# Patient Record
Sex: FEMALE | Race: WHITE | NOT HISPANIC OR LATINO | URBAN - METROPOLITAN AREA
[De-identification: names, ages, dates, MRNs, and addresses within clinical notes are randomized per-mention and may not be internally consistent; named-entity substitution may affect disease eponyms.]

---

## 2022-08-01 ENCOUNTER — AMBULATORY SURGICAL CENTER (OUTPATIENT)
Dept: URBAN - METROPOLITAN AREA SURGERY 5 | Facility: SURGERY | Age: 47
End: 2022-08-01

## 2022-08-01 ENCOUNTER — OFFICE (OUTPATIENT)
Dept: URBAN - METROPOLITAN AREA PATHOLOGY 1 | Facility: PATHOLOGY | Age: 47
End: 2022-08-01

## 2022-08-01 VITALS
HEART RATE: 60 BPM | HEART RATE: 66 BPM | SYSTOLIC BLOOD PRESSURE: 157 MMHG | SYSTOLIC BLOOD PRESSURE: 133 MMHG | DIASTOLIC BLOOD PRESSURE: 88 MMHG | SYSTOLIC BLOOD PRESSURE: 114 MMHG | SYSTOLIC BLOOD PRESSURE: 136 MMHG | SYSTOLIC BLOOD PRESSURE: 148 MMHG | DIASTOLIC BLOOD PRESSURE: 95 MMHG | OXYGEN SATURATION: 100 % | SYSTOLIC BLOOD PRESSURE: 136 MMHG | RESPIRATION RATE: 13 BRPM | RESPIRATION RATE: 17 BRPM | HEART RATE: 65 BPM | RESPIRATION RATE: 18 BRPM | HEART RATE: 66 BPM | HEIGHT: 67 IN | HEART RATE: 67 BPM | TEMPERATURE: 95.9 F | HEART RATE: 70 BPM | OXYGEN SATURATION: 96 % | OXYGEN SATURATION: 97 % | DIASTOLIC BLOOD PRESSURE: 94 MMHG | HEART RATE: 64 BPM | DIASTOLIC BLOOD PRESSURE: 70 MMHG | RESPIRATION RATE: 16 BRPM | OXYGEN SATURATION: 88 % | RESPIRATION RATE: 17 BRPM | OXYGEN SATURATION: 100 % | DIASTOLIC BLOOD PRESSURE: 73 MMHG | SYSTOLIC BLOOD PRESSURE: 140 MMHG | HEART RATE: 68 BPM | OXYGEN SATURATION: 97 % | OXYGEN SATURATION: 98 % | SYSTOLIC BLOOD PRESSURE: 157 MMHG | OXYGEN SATURATION: 98 % | SYSTOLIC BLOOD PRESSURE: 119 MMHG | DIASTOLIC BLOOD PRESSURE: 77 MMHG | DIASTOLIC BLOOD PRESSURE: 74 MMHG | OXYGEN SATURATION: 96 % | SYSTOLIC BLOOD PRESSURE: 119 MMHG | RESPIRATION RATE: 16 BRPM | HEART RATE: 68 BPM | HEART RATE: 70 BPM | HEIGHT: 67 IN | SYSTOLIC BLOOD PRESSURE: 126 MMHG | WEIGHT: 269 LBS | OXYGEN SATURATION: 88 % | OXYGEN SATURATION: 99 % | DIASTOLIC BLOOD PRESSURE: 74 MMHG | HEART RATE: 65 BPM | WEIGHT: 269 LBS | DIASTOLIC BLOOD PRESSURE: 75 MMHG | HEART RATE: 67 BPM | RESPIRATION RATE: 18 BRPM | DIASTOLIC BLOOD PRESSURE: 95 MMHG | DIASTOLIC BLOOD PRESSURE: 94 MMHG | SYSTOLIC BLOOD PRESSURE: 156 MMHG | HEART RATE: 57 BPM | DIASTOLIC BLOOD PRESSURE: 75 MMHG | SYSTOLIC BLOOD PRESSURE: 156 MMHG | DIASTOLIC BLOOD PRESSURE: 77 MMHG | SYSTOLIC BLOOD PRESSURE: 126 MMHG | RESPIRATION RATE: 15 BRPM | HEART RATE: 60 BPM | OXYGEN SATURATION: 99 % | RESPIRATION RATE: 15 BRPM | SYSTOLIC BLOOD PRESSURE: 140 MMHG | HEART RATE: 86 BPM | DIASTOLIC BLOOD PRESSURE: 88 MMHG | SYSTOLIC BLOOD PRESSURE: 148 MMHG | RESPIRATION RATE: 13 BRPM | HEART RATE: 86 BPM | SYSTOLIC BLOOD PRESSURE: 150 MMHG | DIASTOLIC BLOOD PRESSURE: 79 MMHG | DIASTOLIC BLOOD PRESSURE: 70 MMHG | DIASTOLIC BLOOD PRESSURE: 86 MMHG | HEART RATE: 57 BPM | DIASTOLIC BLOOD PRESSURE: 73 MMHG | SYSTOLIC BLOOD PRESSURE: 150 MMHG | SYSTOLIC BLOOD PRESSURE: 114 MMHG | DIASTOLIC BLOOD PRESSURE: 86 MMHG | HEART RATE: 64 BPM | TEMPERATURE: 95.9 F | SYSTOLIC BLOOD PRESSURE: 133 MMHG | DIASTOLIC BLOOD PRESSURE: 79 MMHG

## 2022-08-01 DIAGNOSIS — R10.84 GENERALIZED ABDOMINAL PAIN: ICD-10-CM

## 2022-08-01 DIAGNOSIS — D12.3 BENIGN NEOPLASM OF TRANSVERSE COLON: ICD-10-CM

## 2022-08-01 DIAGNOSIS — D12.0 BENIGN NEOPLASM OF CECUM: ICD-10-CM

## 2022-08-01 DIAGNOSIS — K31.89 OTHER DISEASES OF STOMACH AND DUODENUM: ICD-10-CM

## 2022-08-01 DIAGNOSIS — K63.5 POLYP OF COLON: ICD-10-CM

## 2022-08-01 DIAGNOSIS — K63.89 OTHER SPECIFIED DISEASES OF INTESTINE: ICD-10-CM

## 2022-08-01 DIAGNOSIS — K21.9 GASTRO-ESOPHAGEAL REFLUX DISEASE WITHOUT ESOPHAGITIS: ICD-10-CM

## 2022-08-01 DIAGNOSIS — R19.4 CHANGE IN BOWEL HABIT: ICD-10-CM

## 2022-08-01 DIAGNOSIS — K90.0 CELIAC DISEASE: ICD-10-CM

## 2022-08-01 DIAGNOSIS — K57.30 DIVERTICULOSIS OF LARGE INTESTINE WITHOUT PERFORATION OR ABS: ICD-10-CM

## 2022-08-01 PROCEDURE — 43235 EGD DIAGNOSTIC BRUSH WASH: CPT | Performed by: INTERNAL MEDICINE

## 2022-08-01 PROCEDURE — 45385 COLONOSCOPY W/LESION REMOVAL: CPT | Performed by: INTERNAL MEDICINE

## 2022-08-01 PROCEDURE — 88305 TISSUE EXAM BY PATHOLOGIST: CPT | Performed by: PATHOLOGY

## 2022-08-01 NOTE — SERVICENOTES
she did have 6 colon polyps on exam and will have a colonoscopy in 3 years.  She will continue treatment for reflux and dyspepsia and continue PPI and continue gluten-free diet and follow-up for biopsy results

## 2022-08-01 NOTE — SERVICEHPINOTES
Nursing Note by Esperanza Rodriguez at 05/03/18 12:46 PM     Author:  Esperanza Rodriguez Service:  (none) Author Type:       Filed:  05/03/18 12:46 PM Encounter Date:  5/3/2018 Status:  Signed     :  Esperanza Rodriguez ()            LEFT VOICE MESSAGE[EL1.1M]      Revision History        User Key Date/Time User Provider Type Action    > EL1.1 05/03/18 12:46 PM Esperanza Rodriguez  Sign    M - Manual            
Nursing Note by Esperanza Rodriguez at 05/04/18 09:36 AM     Author:  Esperanza Rodriguez Service:  (none) Author Type:       Filed:  05/04/18 09:38 AM Encounter Date:  5/3/2018 Status:  Signed     :  Esperanza Rodriguez ()            SCHEDULED FOR 06/04/18[EL1.1M]      Revision History        User Key Date/Time User Provider Type Action    > EL1.1 05/04/18 09:38 AM Esperanza Rodriguez  Sign    M - Manual            
Nursing Note by Kayce Yap NCMA at 18 11:38 AM     Author:  Kayce Yap NCMA Service:  (none) Author Type:  Certified Medical Assistant     Filed:  18 11:39 AM Encounter Date:  5/3/2018 Status:  Signed     :  Kayce Yap NCMA (Certified Medical Assistant)            Patient: Landry Norman  : 3/9/1931   MRN: [id number]     Insurance:[JL1.1T]  Medicare[JL1.1M]  ID:[JL1.1T]  [id number][JL1.1C]    Eligibility and benefits verified through [employer].  Prior Authorization is not required.[JL1.1T]           Revision History        User Key Date/Time User Provider Type Action    > JL1.1 18 11:39 AM Kayce Yap NCMA Certified Medical Assistant Sign    C - Copied, M - Manual, T - Template            
Hayley has a history of IBS and constipation and diarrhea and history of anxiety. She does have a history of gluten sensitivity and abdominal bloating. She will have biopsies to rule out celiac as well. She has no prior colonoscopy for colon cancer screening as well

## 2022-08-01 NOTE — SERVICEHPINOTES
Hayley has a history of IBS and constipation and diarrhea and history of anxiety. She does have a history of gluten sensitivity and abdominal bloating. She will have biopsies to rule out celiac as well. She has no prior colonoscopy for colon cancer screening as well

## 2022-08-04 LAB
PDF: PDF REPORT: (no result)
PDF: PDF REPORT: (no result)

## 2022-09-12 ENCOUNTER — OFFICE (OUTPATIENT)
Dept: URBAN - METROPOLITAN AREA CLINIC 16 | Facility: CLINIC | Age: 47
End: 2022-09-12

## 2022-09-12 VITALS
DIASTOLIC BLOOD PRESSURE: 74 MMHG | WEIGHT: 277 LBS | HEIGHT: 67 IN | SYSTOLIC BLOOD PRESSURE: 112 MMHG | HEART RATE: 72 BPM

## 2022-09-12 DIAGNOSIS — R19.4 CHANGE IN BOWEL HABIT: ICD-10-CM

## 2022-09-12 DIAGNOSIS — D12.6 BENIGN NEOPLASM OF COLON, UNSPECIFIED: ICD-10-CM

## 2022-09-12 DIAGNOSIS — K58.1 IRRITABLE BOWEL SYNDROME WITH CONSTIPATION: ICD-10-CM

## 2022-09-12 PROCEDURE — 99214 OFFICE O/P EST MOD 30 MIN: CPT | Performed by: INTERNAL MEDICINE
